# Patient Record
Sex: MALE | Race: WHITE | Employment: UNEMPLOYED | URBAN - METROPOLITAN AREA
[De-identification: names, ages, dates, MRNs, and addresses within clinical notes are randomized per-mention and may not be internally consistent; named-entity substitution may affect disease eponyms.]

---

## 2018-04-20 ENCOUNTER — HOSPITAL ENCOUNTER (EMERGENCY)
Age: 33
Discharge: HOME | End: 2018-04-20
Attending: EMERGENCY MEDICINE
Payer: SELF-PAY

## 2018-04-20 VITALS
RESPIRATION RATE: 16 BRPM | HEIGHT: 73 IN | BODY MASS INDEX: 22.33 KG/M2 | WEIGHT: 168.5 LBS | DIASTOLIC BLOOD PRESSURE: 75 MMHG | SYSTOLIC BLOOD PRESSURE: 123 MMHG | TEMPERATURE: 98.2 F | OXYGEN SATURATION: 97 % | HEART RATE: 79 BPM

## 2018-04-20 DIAGNOSIS — L02.01 FACIAL ABSCESS: Primary | ICD-10-CM

## 2018-04-20 PROCEDURE — 74011250637 HC RX REV CODE- 250/637: Performed by: PHYSICIAN ASSISTANT

## 2018-04-20 PROCEDURE — 99283 EMERGENCY DEPT VISIT LOW MDM: CPT

## 2018-04-20 RX ORDER — CEPHALEXIN 500 MG/1
500 CAPSULE ORAL 4 TIMES DAILY
Qty: 27 CAP | Refills: 0 | Status: SHIPPED | OUTPATIENT
Start: 2018-04-20 | End: 2018-04-27

## 2018-04-20 RX ORDER — ACETAMINOPHEN 500 MG
1000 TABLET ORAL
Status: COMPLETED | OUTPATIENT
Start: 2018-04-20 | End: 2018-04-20

## 2018-04-20 RX ORDER — SULFAMETHOXAZOLE AND TRIMETHOPRIM 800; 160 MG/1; MG/1
1 TABLET ORAL 2 TIMES DAILY
Qty: 13 TAB | Refills: 0 | Status: SHIPPED | OUTPATIENT
Start: 2018-04-20 | End: 2018-04-27

## 2018-04-20 RX ORDER — CEPHALEXIN 500 MG/1
500 CAPSULE ORAL
Status: COMPLETED | OUTPATIENT
Start: 2018-04-20 | End: 2018-04-20

## 2018-04-20 RX ORDER — NAPROXEN 500 MG/1
500 TABLET ORAL 2 TIMES DAILY WITH MEALS
Qty: 20 TAB | Refills: 0 | Status: SHIPPED | OUTPATIENT
Start: 2018-04-20 | End: 2018-04-30

## 2018-04-20 RX ORDER — SULFAMETHOXAZOLE AND TRIMETHOPRIM 800; 160 MG/1; MG/1
1 TABLET ORAL
Status: COMPLETED | OUTPATIENT
Start: 2018-04-20 | End: 2018-04-20

## 2018-04-20 RX ADMIN — ACETAMINOPHEN 1000 MG: 500 TABLET, FILM COATED ORAL at 20:15

## 2018-04-20 RX ADMIN — SULFAMETHOXAZOLE AND TRIMETHOPRIM 1 TABLET: 800; 160 TABLET ORAL at 20:15

## 2018-04-20 RX ADMIN — CEPHALEXIN 500 MG: 500 CAPSULE ORAL at 20:15

## 2018-04-20 NOTE — ED TRIAGE NOTES
Pt states that he thought he had a sinus infection and his nose was raw on the inside and its moved to his lip. Pt feels like his nose is swollen and his upper lip is swollen, mild swelling noted in triage. Pt states that this started 3-4 days ago.

## 2018-04-21 NOTE — DISCHARGE INSTRUCTIONS

## 2018-04-21 NOTE — ED NOTES
Discharge instructions given to patient by PA and nurse. Pt has been given counseling on medication use and verbalizes understanding. Pt ambulated off of unit in no signs of distress.

## 2018-04-21 NOTE — ED PROVIDER NOTES
HPI Comments: 29 y.o. male with no significant past medical history who presents from home with chief complaint of nasal pain. Pt reports 4 day hx pain to his nose. Pt states that his nose has been \"super dry, bleeding sometimes and crusting\". He has pain that is constant which is worsening. Pt states throughout the day today he has developed swelling to his upper lip, chills, mild sore throat and nausea. He took Tylenol. No known fever. Pt further notes being diagnosed with an abscess to frontal/maxillary sinus region x 1 month ago. The pain improved up until 4 days ago. Pt denies vomiting, rhinorrhea, ear pain, cough, congestion, headache, neck pain, back pain, abdominal pain, headache or known injury. There are no other acute medical concerns at this time. Pt states that he resides at a recovery house and would not like narcotics. PCP: No primary care provider on file. Note written by Juan Barakat, as dictated by [place], [place] 8:08 PM    The history is provided by the patient. No  was used. History reviewed. No pertinent past medical history. Past Surgical History:   Procedure Laterality Date    HX ORTHOPAEDIC      left ankle         History reviewed. No pertinent family history. Social History     Social History    Marital status: N/A     Spouse name: N/A    Number of children: N/A    Years of education: N/A     Occupational History    Not on file. Social History Main Topics    Smoking status: Never Smoker    Smokeless tobacco: Never Used    Alcohol use No    Drug use: Not on file    Sexual activity: Not on file     Other Topics Concern    Not on file     Social History Narrative    No narrative on file         ALLERGIES: Clindamycin    Review of Systems   Constitutional: Negative for fever. HENT: Positive for nosebleeds, sinus pain and sore throat.  Negative for ear pain and rhinorrhea.         + Nasal pain   Respiratory: Negative for cough. Cardiovascular: Negative for chest pain. Gastrointestinal: Positive for nausea. Negative for abdominal pain and vomiting. Neurological: Negative for headaches. All other systems reviewed and are negative. Vitals:    04/20/18 1921   BP: 133/79   Pulse: 80   Resp: 16   Temp: 98.6 °F (37 °C)   SpO2: 99%   Weight: 76.4 kg (168 lb 8 oz)   Height: 6' 1\" (1.854 m)            Physical Exam   Constitutional: He is oriented to person, place, and time. He appears well-developed and well-nourished. No distress.  adult male in NAD   HENT:   Head: Normocephalic and atraumatic. Right Ear: External ear normal.   Left Ear: External ear normal.   Nose: Nose normal. No mucosal edema, rhinorrhea, septal deviation or nasal septal hematoma. No foreign bodies. Mouth/Throat: Uvula is midline and oropharynx is clear and moist. No oropharyngeal exudate. Eyes: Conjunctivae and EOM are normal. Pupils are equal, round, and reactive to light. Right eye exhibits no discharge. Left eye exhibits no discharge. Neck: Normal range of motion. Neck supple. Cardiovascular: Normal rate, regular rhythm and normal heart sounds. Pulmonary/Chest: Effort normal and breath sounds normal. He has no wheezes. He has no rales. Abdominal: Soft. Bowel sounds are normal. He exhibits no distension. There is no tenderness. There is no guarding. Musculoskeletal: Normal range of motion. Lymphadenopathy:     He has no cervical adenopathy. Neurological: He is alert and oriented to person, place, and time. No cranial nerve deficit. Skin: Skin is warm and dry. He is not diaphoretic. Psychiatric: He has a normal mood and affect. His behavior is normal.   Nursing note and vitals reviewed. MDM  Number of Diagnoses or Management Options  Facial abscess:   Diagnosis management comments: 30 yo male with complaint of nasal pain.  Concern for cellulitis vs developing abscess vs impetigo    Plan  Bactrim  Keflex  Warm compress  Analgesia  PCP follow-up.  Hollie Posada-Simone, [place]          ED Course       Procedures